# Patient Record
Sex: MALE | Race: WHITE | Employment: OTHER | URBAN - METROPOLITAN AREA
[De-identification: names, ages, dates, MRNs, and addresses within clinical notes are randomized per-mention and may not be internally consistent; named-entity substitution may affect disease eponyms.]

---

## 2017-11-02 ENCOUNTER — THERAPY VISIT (OUTPATIENT)
Dept: PHYSICAL THERAPY | Facility: CLINIC | Age: 22
End: 2017-11-02
Payer: COMMERCIAL

## 2017-11-02 DIAGNOSIS — Z47.89 AFTERCARE FOLLOWING SURGERY OF THE MUSCULOSKELETAL SYSTEM: ICD-10-CM

## 2017-11-02 DIAGNOSIS — M79.651 PAIN OF RIGHT THIGH: Primary | ICD-10-CM

## 2017-11-02 PROCEDURE — 97110 THERAPEUTIC EXERCISES: CPT | Mod: GP | Performed by: PHYSICAL THERAPIST

## 2017-11-02 PROCEDURE — 97161 PT EVAL LOW COMPLEX 20 MIN: CPT | Mod: GP | Performed by: PHYSICAL THERAPIST
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
## 2017-11-03 PROBLEM — M79.651 PAIN OF RIGHT THIGH: Status: ACTIVE | Noted: 2017-11-03

## 2017-11-03 PROBLEM — Z47.89 AFTERCARE FOLLOWING SURGERY OF THE MUSCULOSKELETAL SYSTEM: Status: ACTIVE | Noted: 2017-11-03

## 2017-11-06 ENCOUNTER — THERAPY VISIT (OUTPATIENT)
Dept: PHYSICAL THERAPY | Facility: CLINIC | Age: 22
End: 2017-11-06
Payer: COMMERCIAL

## 2017-11-06 DIAGNOSIS — Z47.89 AFTERCARE FOLLOWING SURGERY OF THE MUSCULOSKELETAL SYSTEM: ICD-10-CM

## 2017-11-06 DIAGNOSIS — M79.651 PAIN OF RIGHT THIGH: ICD-10-CM

## 2017-11-06 PROCEDURE — 97112 NEUROMUSCULAR REEDUCATION: CPT | Mod: GP | Performed by: PHYSICAL THERAPIST

## 2017-11-06 PROCEDURE — 97010 HOT OR COLD PACKS THERAPY: CPT | Mod: GP | Performed by: PHYSICAL THERAPIST

## 2017-11-06 PROCEDURE — 97110 THERAPEUTIC EXERCISES: CPT | Mod: GP | Performed by: PHYSICAL THERAPIST

## 2017-11-09 ENCOUNTER — THERAPY VISIT (OUTPATIENT)
Dept: PHYSICAL THERAPY | Facility: CLINIC | Age: 22
End: 2017-11-09
Payer: COMMERCIAL

## 2017-11-09 DIAGNOSIS — M79.651 PAIN OF RIGHT THIGH: ICD-10-CM

## 2017-11-09 DIAGNOSIS — Z47.89 AFTERCARE FOLLOWING SURGERY OF THE MUSCULOSKELETAL SYSTEM: ICD-10-CM

## 2017-11-09 PROCEDURE — 97112 NEUROMUSCULAR REEDUCATION: CPT | Mod: GP | Performed by: PHYSICAL THERAPIST

## 2017-11-09 PROCEDURE — 97110 THERAPEUTIC EXERCISES: CPT | Mod: GP | Performed by: PHYSICAL THERAPIST

## 2017-11-09 PROCEDURE — 97010 HOT OR COLD PACKS THERAPY: CPT | Mod: GP | Performed by: PHYSICAL THERAPIST

## 2017-11-13 ENCOUNTER — THERAPY VISIT (OUTPATIENT)
Dept: PHYSICAL THERAPY | Facility: CLINIC | Age: 22
End: 2017-11-13
Payer: COMMERCIAL

## 2017-11-13 DIAGNOSIS — M79.651 PAIN OF RIGHT THIGH: ICD-10-CM

## 2017-11-13 DIAGNOSIS — Z47.89 AFTERCARE FOLLOWING SURGERY OF THE MUSCULOSKELETAL SYSTEM: ICD-10-CM

## 2017-11-13 PROCEDURE — 97010 HOT OR COLD PACKS THERAPY: CPT | Mod: GP | Performed by: PHYSICAL THERAPIST

## 2017-11-13 PROCEDURE — 97112 NEUROMUSCULAR REEDUCATION: CPT | Mod: GP | Performed by: PHYSICAL THERAPIST

## 2017-11-13 PROCEDURE — 97110 THERAPEUTIC EXERCISES: CPT | Mod: GP | Performed by: PHYSICAL THERAPIST

## 2017-11-17 ENCOUNTER — THERAPY VISIT (OUTPATIENT)
Dept: PHYSICAL THERAPY | Facility: CLINIC | Age: 22
End: 2017-11-17
Payer: COMMERCIAL

## 2017-11-17 DIAGNOSIS — Z47.89 AFTERCARE FOLLOWING SURGERY OF THE MUSCULOSKELETAL SYSTEM: ICD-10-CM

## 2017-11-17 DIAGNOSIS — M79.651 PAIN OF RIGHT THIGH: ICD-10-CM

## 2017-11-17 PROCEDURE — 97110 THERAPEUTIC EXERCISES: CPT | Mod: GP | Performed by: PHYSICAL THERAPIST

## 2017-11-17 PROCEDURE — 97112 NEUROMUSCULAR REEDUCATION: CPT | Mod: GP | Performed by: PHYSICAL THERAPIST

## 2017-11-17 PROCEDURE — 97010 HOT OR COLD PACKS THERAPY: CPT | Mod: GP | Performed by: PHYSICAL THERAPIST

## 2017-11-20 ENCOUNTER — THERAPY VISIT (OUTPATIENT)
Dept: PHYSICAL THERAPY | Facility: CLINIC | Age: 22
End: 2017-11-20
Payer: COMMERCIAL

## 2017-11-20 DIAGNOSIS — Z47.89 AFTERCARE FOLLOWING SURGERY OF THE MUSCULOSKELETAL SYSTEM: ICD-10-CM

## 2017-11-20 DIAGNOSIS — M79.651 PAIN OF RIGHT THIGH: ICD-10-CM

## 2017-11-20 PROCEDURE — 97010 HOT OR COLD PACKS THERAPY: CPT | Mod: GP | Performed by: PHYSICAL THERAPIST

## 2017-11-20 PROCEDURE — 97112 NEUROMUSCULAR REEDUCATION: CPT | Mod: GP | Performed by: PHYSICAL THERAPIST

## 2017-11-20 PROCEDURE — 97110 THERAPEUTIC EXERCISES: CPT | Mod: GP | Performed by: PHYSICAL THERAPIST

## 2017-11-24 ENCOUNTER — THERAPY VISIT (OUTPATIENT)
Dept: PHYSICAL THERAPY | Facility: CLINIC | Age: 22
End: 2017-11-24
Payer: COMMERCIAL

## 2017-11-24 DIAGNOSIS — M79.651 PAIN OF RIGHT THIGH: ICD-10-CM

## 2017-11-24 DIAGNOSIS — Z47.89 AFTERCARE FOLLOWING SURGERY OF THE MUSCULOSKELETAL SYSTEM: ICD-10-CM

## 2017-11-24 PROCEDURE — 97112 NEUROMUSCULAR REEDUCATION: CPT | Mod: GP | Performed by: PHYSICAL THERAPIST

## 2017-11-24 PROCEDURE — 97010 HOT OR COLD PACKS THERAPY: CPT | Mod: GP | Performed by: PHYSICAL THERAPIST

## 2017-11-24 PROCEDURE — 97110 THERAPEUTIC EXERCISES: CPT | Mod: GP | Performed by: PHYSICAL THERAPIST

## 2017-11-27 ENCOUNTER — THERAPY VISIT (OUTPATIENT)
Dept: PHYSICAL THERAPY | Facility: CLINIC | Age: 22
End: 2017-11-27
Payer: COMMERCIAL

## 2017-11-27 DIAGNOSIS — Z47.89 AFTERCARE FOLLOWING SURGERY OF THE MUSCULOSKELETAL SYSTEM: ICD-10-CM

## 2017-11-27 DIAGNOSIS — M79.651 PAIN OF RIGHT THIGH: ICD-10-CM

## 2017-11-27 PROCEDURE — 97112 NEUROMUSCULAR REEDUCATION: CPT | Mod: GP | Performed by: PHYSICAL THERAPIST

## 2017-11-27 PROCEDURE — 97110 THERAPEUTIC EXERCISES: CPT | Mod: GP | Performed by: PHYSICAL THERAPIST

## 2017-11-27 PROCEDURE — 97010 HOT OR COLD PACKS THERAPY: CPT | Mod: GP | Performed by: PHYSICAL THERAPIST

## 2021-07-20 ENCOUNTER — ANCILLARY PROCEDURE (OUTPATIENT)
Dept: GENERAL RADIOLOGY | Facility: CLINIC | Age: 26
End: 2021-07-20
Attending: PHYSICAL MEDICINE & REHABILITATION
Payer: COMMERCIAL

## 2021-07-20 ENCOUNTER — OFFICE VISIT (OUTPATIENT)
Dept: ORTHOPEDICS | Facility: CLINIC | Age: 26
End: 2021-07-20
Payer: COMMERCIAL

## 2021-07-20 VITALS
HEART RATE: 66 BPM | DIASTOLIC BLOOD PRESSURE: 57 MMHG | OXYGEN SATURATION: 96 % | BODY MASS INDEX: 23.56 KG/M2 | WEIGHT: 164.2 LBS | SYSTOLIC BLOOD PRESSURE: 115 MMHG

## 2021-07-20 DIAGNOSIS — M25.852 FEMOROACETABULAR IMPINGEMENT OF BOTH HIPS: ICD-10-CM

## 2021-07-20 DIAGNOSIS — M21.70 LEG LENGTH DISCREPANCY: ICD-10-CM

## 2021-07-20 DIAGNOSIS — M25.851 FEMOROACETABULAR IMPINGEMENT OF BOTH HIPS: ICD-10-CM

## 2021-07-20 DIAGNOSIS — R29.898 WEAKNESS OF BOTH HIPS: ICD-10-CM

## 2021-07-20 DIAGNOSIS — M24.851 RIGHT SNAPPING HIP: ICD-10-CM

## 2021-07-20 DIAGNOSIS — M54.17 LUMBOSACRAL RADICULOPATHY: ICD-10-CM

## 2021-07-20 DIAGNOSIS — M51.379 DDD (DEGENERATIVE DISC DISEASE), LUMBOSACRAL: ICD-10-CM

## 2021-07-20 PROCEDURE — 77073 BONE LENGTH STUDIES: CPT | Performed by: RADIOLOGY

## 2021-07-20 PROCEDURE — 99205 OFFICE O/P NEW HI 60 MIN: CPT | Performed by: PHYSICAL MEDICINE & REHABILITATION

## 2021-07-20 ASSESSMENT — ENCOUNTER SYMPTOMS
WEIGHT GAIN: 0
LEG SWELLING: 0
PALPITATIONS: 0
DEPRESSION: 0
ARTHRALGIAS: 1
SEIZURES: 0
NAUSEA: 0
DIFFICULTY URINATING: 0
FEVER: 0
HEARTBURN: 0
CONSTIPATION: 0
BLOOD IN STOOL: 0
DIZZINESS: 0
DYSURIA: 0
VOMITING: 0
HEMATURIA: 0
WEIGHT LOSS: 0
FATIGUE: 0
ABDOMINAL PAIN: 0
HOARSE VOICE: 0
BOWEL INCONTINENCE: 0
BRUISES/BLEEDS EASILY: 0
SWOLLEN GLANDS: 0
EYE PAIN: 0
POOR WOUND HEALING: 0
COUGH: 0
NERVOUS/ANXIOUS: 0
TROUBLE SWALLOWING: 0
HEADACHES: 0
SHORTNESS OF BREATH: 0
DIARRHEA: 0
INSOMNIA: 0
WHEEZING: 0
MYALGIAS: 1
LOSS OF CONSCIOUSNESS: 0

## 2021-07-20 ASSESSMENT — PAIN SCALES - GENERAL: PAINLEVEL: MILD PAIN (2)

## 2021-07-20 NOTE — NURSING NOTE
"July 20, 2021 3:10 PM   Eliot Carroll is a 25 year old male who presents for:    No chief complaint on file.    Initial Vitals: /57   Pulse 66   Wt 74.5 kg (164 lb 3.2 oz)   SpO2 96%   BMI 23.56 kg/m   Estimated body mass index is 23.56 kg/m  as calculated from the following:    Height as of 11/21/13: 1.778 m (5' 10\").    Weight as of this encounter: 74.5 kg (164 lb 3.2 oz). Body surface area is 1.92 meters squared.  Mild Pain (2) Comment: Data Unavailable       Clinical concerns: LB and Hip Pain  Stew Ortez, ATC      "
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
## 2021-07-20 NOTE — PATIENT INSTRUCTIONS
Please schedule for MRI lumbar spine.  Please schedule follow-up appointment after MRI lumbar spine.    The [facility] team will call you to schedule your imaging exam in the next 1-2 days. You can also call them directly at [facility] [phone] ([address], [address], [place], [place]) and [facility] [phone] ([address]) to schedule your appointment.
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
## 2021-07-29 ENCOUNTER — HOSPITAL ENCOUNTER (OUTPATIENT)
Dept: MRI IMAGING | Facility: CLINIC | Age: 26
Discharge: HOME | End: 2021-07-29
Attending: PHYSICAL MEDICINE & REHABILITATION | Admitting: PHYSICAL MEDICINE & REHABILITATION
Payer: COMMERCIAL

## 2021-07-29 DIAGNOSIS — M51.379 DDD (DEGENERATIVE DISC DISEASE), LUMBOSACRAL: ICD-10-CM

## 2021-07-29 DIAGNOSIS — M54.17 LUMBOSACRAL RADICULOPATHY: ICD-10-CM

## 2021-07-29 PROCEDURE — 72148 MRI LUMBAR SPINE W/O DYE: CPT

## 2021-07-30 ENCOUNTER — OFFICE VISIT (OUTPATIENT)
Dept: ORTHOPEDICS | Facility: CLINIC | Age: 26
End: 2021-07-30
Payer: COMMERCIAL

## 2021-07-30 VITALS
BODY MASS INDEX: 23.24 KG/M2 | OXYGEN SATURATION: 98 % | DIASTOLIC BLOOD PRESSURE: 62 MMHG | SYSTOLIC BLOOD PRESSURE: 118 MMHG | WEIGHT: 162 LBS | HEART RATE: 68 BPM

## 2021-07-30 DIAGNOSIS — M24.851 RIGHT SNAPPING HIP: ICD-10-CM

## 2021-07-30 DIAGNOSIS — M25.851 FEMOROACETABULAR IMPINGEMENT OF BOTH HIPS: ICD-10-CM

## 2021-07-30 DIAGNOSIS — M21.70 LEG LENGTH DISCREPANCY: Primary | ICD-10-CM

## 2021-07-30 DIAGNOSIS — M70.71 ILIOPSOAS BURSITIS OF RIGHT HIP: ICD-10-CM

## 2021-07-30 DIAGNOSIS — M25.852 FEMOROACETABULAR IMPINGEMENT OF BOTH HIPS: ICD-10-CM

## 2021-07-30 DIAGNOSIS — M51.379 DDD (DEGENERATIVE DISC DISEASE), LUMBOSACRAL: ICD-10-CM

## 2021-07-30 DIAGNOSIS — R29.898 WEAKNESS OF BOTH HIPS: ICD-10-CM

## 2021-07-30 PROCEDURE — 99215 OFFICE O/P EST HI 40 MIN: CPT | Performed by: PHYSICAL MEDICINE & REHABILITATION

## 2021-07-30 ASSESSMENT — PAIN SCALES - GENERAL: PAINLEVEL: MODERATE PAIN (4)
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
## 2021-07-30 NOTE — NURSING NOTE
"July 30, 2021 12:37 PM   Eliot Carroll is a 26 year old male who presents for:    Chief Complaint   Patient presents with     RECHECK     MRI f/u 7/29/21     Initial Vitals: /62   Pulse 68   Wt 162 lb (73.5 kg)   SpO2 98%   BMI 23.24 kg/m   Estimated body mass index is 23.24 kg/m  as calculated from the following:    Height as of 11/21/13: 5' 10\" (1.778 m).    Weight as of this encounter: 162 lb (73.5 kg). Body surface area is 1.91 meters squared.  Moderate Pain (4) Comment: Data Unavailable       Clinical concerns: MRI f/u 7/29/21  Stew Ortez, ATC    "

## 2021-07-30 NOTE — NURSING NOTE
"July 30, 2021 12:04 PM   Eliot Carroll is a 26 year old male who presents for:    No chief complaint on file.    Initial Vitals: /62   Pulse 68   Wt 162 lb (73.5 kg)   SpO2 98%   BMI 23.24 kg/m   Estimated body mass index is 23.24 kg/m  as calculated from the following:    Height as of 11/21/13: 5' 10\" (1.778 m).    Weight as of this encounter: 162 lb (73.5 kg). Body surface area is 1.91 meters squared.  Data Unavailable Comment: Data Unavailable       Clinical concerns: MRI f/u 7/29/21  Stew Ortez, ATC      "

## 2021-12-24 ENCOUNTER — HOSPITAL ENCOUNTER (EMERGENCY)
Facility: CLINIC | Age: 26
Discharge: HOME | End: 2021-12-24
Attending: EMERGENCY MEDICINE | Admitting: EMERGENCY MEDICINE
Payer: COMMERCIAL

## 2021-12-24 ENCOUNTER — APPOINTMENT (OUTPATIENT)
Dept: GENERAL RADIOLOGY | Facility: CLINIC | Age: 26
End: 2021-12-24
Attending: EMERGENCY MEDICINE
Payer: COMMERCIAL

## 2021-12-24 VITALS
OXYGEN SATURATION: 99 % | BODY MASS INDEX: 22.9 KG/M2 | SYSTOLIC BLOOD PRESSURE: 117 MMHG | HEART RATE: 65 BPM | RESPIRATION RATE: 15 BRPM | WEIGHT: 160 LBS | HEIGHT: 70 IN | TEMPERATURE: 97.8 F | DIASTOLIC BLOOD PRESSURE: 73 MMHG

## 2021-12-24 DIAGNOSIS — R07.9 CHEST PAIN, UNSPECIFIED TYPE: ICD-10-CM

## 2021-12-24 DIAGNOSIS — R00.2 PALPITATIONS: ICD-10-CM

## 2021-12-24 LAB
ANION GAP SERPL CALCULATED.3IONS-SCNC: 7 MMOL/L (ref 3–14)
BASOPHILS # BLD AUTO: 0 10E3/UL (ref 0–0.2)
BASOPHILS NFR BLD AUTO: 1 %
BUN SERPL-MCNC: 31 MG/DL (ref 7–30)
CALCIUM SERPL-MCNC: 9.2 MG/DL (ref 8.5–10.1)
CHLORIDE BLD-SCNC: 107 MMOL/L (ref 94–109)
CK SERPL-CCNC: 148 U/L (ref 30–300)
CO2 SERPL-SCNC: 26 MMOL/L (ref 20–32)
CREAT SERPL-MCNC: 0.98 MG/DL (ref 0.66–1.25)
EOSINOPHIL # BLD AUTO: 0.1 10E3/UL (ref 0–0.7)
EOSINOPHIL NFR BLD AUTO: 4 %
ERYTHROCYTE [DISTWIDTH] IN BLOOD BY AUTOMATED COUNT: 11.6 % (ref 10–15)
GFR SERPL CREATININE-BSD FRML MDRD: >90 ML/MIN/1.73M2
GLUCOSE BLD-MCNC: 88 MG/DL (ref 70–99)
HCT VFR BLD AUTO: 41.3 % (ref 40–53)
HGB BLD-MCNC: 14.2 G/DL (ref 13.3–17.7)
HOLD SPECIMEN: NORMAL
IMM GRANULOCYTES # BLD: 0 10E3/UL
IMM GRANULOCYTES NFR BLD: 0 %
LYMPHOCYTES # BLD AUTO: 0.7 10E3/UL (ref 0.8–5.3)
LYMPHOCYTES NFR BLD AUTO: 18 %
MCH RBC QN AUTO: 30.9 PG (ref 26.5–33)
MCHC RBC AUTO-ENTMCNC: 34.4 G/DL (ref 31.5–36.5)
MCV RBC AUTO: 90 FL (ref 78–100)
MONOCYTES # BLD AUTO: 0.3 10E3/UL (ref 0–1.3)
MONOCYTES NFR BLD AUTO: 9 %
NEUTROPHILS # BLD AUTO: 2.6 10E3/UL (ref 1.6–8.3)
NEUTROPHILS NFR BLD AUTO: 68 %
NRBC # BLD AUTO: 0 10E3/UL
NRBC BLD AUTO-RTO: 0 /100
PLATELET # BLD AUTO: 156 10E3/UL (ref 150–450)
POTASSIUM BLD-SCNC: 4.1 MMOL/L (ref 3.4–5.3)
RBC # BLD AUTO: 4.6 10E6/UL (ref 4.4–5.9)
SODIUM SERPL-SCNC: 140 MMOL/L (ref 133–144)
TROPONIN I SERPL HS-MCNC: 19 NG/L
WBC # BLD AUTO: 3.7 10E3/UL (ref 4–11)

## 2021-12-24 PROCEDURE — 71046 X-RAY EXAM CHEST 2 VIEWS: CPT

## 2021-12-24 PROCEDURE — 96360 HYDRATION IV INFUSION INIT: CPT

## 2021-12-24 PROCEDURE — 99285 EMERGENCY DEPT VISIT HI MDM: CPT | Mod: 25

## 2021-12-24 PROCEDURE — 258N000003 HC RX IP 258 OP 636: Performed by: EMERGENCY MEDICINE

## 2021-12-24 PROCEDURE — 36415 COLL VENOUS BLD VENIPUNCTURE: CPT | Performed by: EMERGENCY MEDICINE

## 2021-12-24 PROCEDURE — 82374 ASSAY BLOOD CARBON DIOXIDE: CPT | Performed by: EMERGENCY MEDICINE

## 2021-12-24 PROCEDURE — 84484 ASSAY OF TROPONIN QUANT: CPT | Performed by: EMERGENCY MEDICINE

## 2021-12-24 PROCEDURE — 85025 COMPLETE CBC W/AUTO DIFF WBC: CPT | Performed by: EMERGENCY MEDICINE

## 2021-12-24 PROCEDURE — 93005 ELECTROCARDIOGRAM TRACING: CPT

## 2021-12-24 PROCEDURE — 82550 ASSAY OF CK (CPK): CPT | Performed by: EMERGENCY MEDICINE

## 2021-12-24 RX ADMIN — SODIUM CHLORIDE 1000 ML: 9 INJECTION, SOLUTION INTRAVENOUS at 21:23

## 2021-12-24 ASSESSMENT — MIFFLIN-ST. JEOR: SCORE: 1702.01

## 2021-12-25 NOTE — ED TRIAGE NOTES
"CP started \"multiple days\" ago, having palpitations \"fitbit went down to like 40 beats per minute\". Dizziness with standing. Pt states MUNGUIA. Pt states getting \"hot and cold\" but never measured temp at home. Pt vaccinated for covid x2. No sick contacts.  "

## 2021-12-25 NOTE — DISCHARGE INSTRUCTIONS
We have done x-ray and lab work and these are all normal.  As we have discussed this, not have a test answer for chest pain in an emergency room.  There is no concerns of pericarditis collapsed lung heart attack or other concern.  Chest pain worse when we touch the area may be musculoskeletal.  If you have ongoing episodes where you feel irregularities in your pulse please discuss further work-up with your regular doctor we are suspicious of mild dehydration please continue to drink 4 to 6 glasses of 8 ounces of water in a day.  Use caffeine judiciously.

## 2021-12-25 NOTE — ED PROVIDER NOTES
History     Chief Complaint:    No chief complaint on file.      HPI   Eliot Carroll is a 26 year old male who presents with irregular pulse and chest pain.    Patient is an otherwise healthy 26-year-old male who states he uses excessive Adderall.  Patient does do triathlons last was a month ago he does run and bike occasionally has chest pain with exertion.  He has developed a feeling that his heart has skipped a beat.  He also has left-sided chest pain worse when he walks.  He wonders if he did something to his heart with all his methamphetamine use and presents to the emergency room for further assessment.  Denies chest pain with respiration no recent surgery no calf pain or swelling no history of prior DVT.      Review of Systems  Positive for left-sided chest pain positive for palpitations positive shortness of breath and chest pain with exertion negative for vomiting or diarrhea negative for black or tarry stool negative for calf pain or swelling all the systems negative except as above    Allergies:    No Known Allergies      Medications:      Amphetamine-Dextroamphetamine (ADDERALL PO)        Past Medical History:      Past Medical History:   Diagnosis Date     ADD (attention deficit disorder)      Patient Active Problem List    Diagnosis Date Noted     Iliopsoas bursitis of right hip 07/30/2021     Priority: Medium     Weakness of both hips 07/20/2021     Priority: Medium     Right snapping hip 07/20/2021     Priority: Medium     Femoroacetabular impingement of both hips 07/20/2021     Priority: Medium     DDD (degenerative disc disease), lumbosacral 07/20/2021     Priority: Medium     Leg length discrepancy 07/20/2021     Priority: Medium     Pain of right thigh 11/03/2017     Priority: Medium     Aftercare following surgery of the musculoskeletal system 11/03/2017     Priority: Medium     ADD (attention deficit disorder)      Priority: Medium        Past Surgical History:      Past Surgical History:  "  Procedure Laterality Date     APPENDECTOMY       TONSILLECTOMY & ADENOIDECTOMY         Family History:      Family History   Problem Relation Age of Onset     Thyroid Disease Mother      Thyroid Disease Sister        Social History:  Patient admits to history of Adderall abuse none recently.  Denies other drugs of abuse.    Physical Exam     Patient Vitals for the past 24 hrs:   BP Temp Temp src Pulse Resp SpO2 Height Weight   12/24/21 2037 137/73 98.3  F (36.8  C) Temporal 79 16 100 % 1.778 m (5' 10\") 72.6 kg (160 lb)       Physical Exam  Vitals reviewed.   HENT:      Head: Normocephalic.   Cardiovascular:      Rate and Rhythm: Normal rate and regular rhythm.      Heart sounds: Normal heart sounds.    No systolic murmur is present.      Pulmonary:      Effort: Pulmonary effort is normal.      Breath sounds: Normal breath sounds.   Musculoskeletal:         General: Normal range of motion.   Skin:     General: Skin is warm.   Neurological:      General: No focal deficit present.      Mental Status: He is alert.   Psychiatric:         Mood and Affect: Mood is anxious.           Emergency Department Course   ECG:  ECG taken at 2042, ECG read at 2100       Rate 69 bpm. CO interval 136 ms. QRS duration 82 ms. QT/QTc 374/400 ms. normal sinus rhythm no ST or T wave abnormalities.    Imaging:  Chest XR,  PA & LAT   Final Result   IMPRESSION: Negative chest.          Laboratory:  Labs Ordered and Resulted from Time of ED Arrival to Time of ED Departure   CBC WITH PLATELETS AND DIFFERENTIAL - Abnormal       Result Value    WBC Count 3.7 (*)     RBC Count 4.60      Hemoglobin 14.2      Hematocrit 41.3      MCV 90      MCH 30.9      MCHC 34.4      RDW 11.6      Platelet Count 156      % Neutrophils 68      % Lymphocytes 18      % Monocytes 9      % Eosinophils 4      % Basophils 1      % Immature Granulocytes 0      NRBCs per 100 WBC 0      Absolute Neutrophils 2.6      Absolute Lymphocytes 0.7 (*)     Absolute Monocytes 0.3  "     Absolute Eosinophils 0.1      Absolute Basophils 0.0      Absolute Immature Granulocytes 0.0      Absolute NRBCs 0.0     TROPONIN I   BASIC METABOLIC PANEL   CK TOTAL         Emergency Department Course:           Reviewed:    I reviewed nursing notes and vitals    Assessments:  2045 I obtained history and examined the patient as noted above.   2145 I rechecked the patient and explained findings.       Consults:            Interventions:    Medications   0.9% sodium chloride BOLUS (1,000 mLs Intravenous New Bag 12/24/21 2123)       Disposition:  The patient was discharged to home.    Impression & Plan           Medical Decision Making:  Patient presents with a multiplicity of complaints including chest pain and palpitations low heart rate and dizziness with standing.  Patient is very well-appearing has no active medical problems patient is PERC negative and therefore PE evaluation was not performed.  Chest x-ray is negative for pneumothorax EKG shows no signs of pericarditis troponin is sent for myocarditis in the Covid pandemic as well as screen for myocardial injury and was negative.  Patient's orthostatics were negative was given a liter of fluid to help with hydration due to slightly elevated BUN.  Patient is recommended discharge at this time.  Patient was offered reassurance his EKG is normal doubt significant arrhythmia with palpitations but recommend discussions about the utility of Holter monitoring through primary care and return with worsening condition.  Patient was discharged in stable condition.      Covid-19  Eliot Carroll was evaluated during a global COVID-19 pandemic, which necessitated consideration that the patient might be at risk for infection with the SARS-CoV-2 virus that causes COVID-19.   Applicable protocols for evaluation were followed during the patient's care.     Diagnosis:    ICD-10-CM    1. Palpitations  R00.2    2. Chest pain, unspecified type  R07.9        Discharge  Medications:  Discharge Medication List as of 12/24/2021 11:08 PM            Scribe Disclosure:  I, Bony Mcarthur MD, am serving as a scribe at 9:41 PM on 12/24/2021 to document services personally performed by Bony Mcarthur MD based on my observations and the provider's statements to me.      Bony Mcarthur MD  12/25/21 [id number]

## 2021-12-28 LAB
ATRIAL RATE - MUSE: 69 BPM
DIASTOLIC BLOOD PRESSURE - MUSE: NORMAL MMHG
INTERPRETATION ECG - MUSE: NORMAL
P AXIS - MUSE: 35 DEGREES
PR INTERVAL - MUSE: 136 MS
QRS DURATION - MUSE: 82 MS
QT - MUSE: 374 MS
QTC - MUSE: 400 MS
R AXIS - MUSE: 68 DEGREES
SYSTOLIC BLOOD PRESSURE - MUSE: NORMAL MMHG
T AXIS - MUSE: 50 DEGREES
VENTRICULAR RATE- MUSE: 69 BPM